# Patient Record
Sex: MALE | Race: BLACK OR AFRICAN AMERICAN | NOT HISPANIC OR LATINO | Employment: OTHER | URBAN - METROPOLITAN AREA
[De-identification: names, ages, dates, MRNs, and addresses within clinical notes are randomized per-mention and may not be internally consistent; named-entity substitution may affect disease eponyms.]

---

## 2017-10-06 ENCOUNTER — OFFICE VISIT (OUTPATIENT)
Dept: INTERNAL MEDICINE | Facility: CLINIC | Age: 47
End: 2017-10-06

## 2017-10-06 VITALS
BODY MASS INDEX: 27.6 KG/M2 | DIASTOLIC BLOOD PRESSURE: 82 MMHG | RESPIRATION RATE: 20 BRPM | HEART RATE: 70 BPM | OXYGEN SATURATION: 99 % | SYSTOLIC BLOOD PRESSURE: 120 MMHG | WEIGHT: 222 LBS | HEIGHT: 75 IN

## 2017-10-06 DIAGNOSIS — M25.512 CHRONIC PAIN IN LEFT SHOULDER: Primary | ICD-10-CM

## 2017-10-06 DIAGNOSIS — R03.0 ELEVATED BLOOD PRESSURE READING: ICD-10-CM

## 2017-10-06 DIAGNOSIS — G89.29 CHRONIC PAIN IN LEFT SHOULDER: Primary | ICD-10-CM

## 2017-10-06 PROCEDURE — 99214 OFFICE O/P EST MOD 30 MIN: CPT | Performed by: FAMILY MEDICINE

## 2017-10-06 NOTE — PATIENT INSTRUCTIONS
I have ordered a left shoulder xray for you.  Please go to the [facility] at [address] to get this done.  You do not need an appointment.  I have ordered a sports medicine referral for you.  You will be called to set up an appointment with this specialist.  In the mean time, recommend that you continue doing your previous physical therapy exercises and using your shoulder within the limits of mild pain.  I have given you a sample of a medication called Pennsaid for your pain.  You may use this twice a day as needed.  Please follow-up as indicated.  Return to the clinic sooner if your symptoms worsen prior to your specialist appointment or if you have any other concerns.

## 2017-10-06 NOTE — PROGRESS NOTES
"Marcell Malik is a 47 y.o. male who presents with complaint of Shoulder Pain      History of Present Illness   Patient presents with complaint of left shoulder pain.  Problem started in August and has been worsening.  Describes a constant aching pain, lifting and laying on his left side worsens the pain, denies associated left arm weakness, numbness, shoulder redness, bruising or swelling.  Has tried using Aleve, which helps for an hour or two, but then pain recurs.  Admits that he has been progressively increasing the intensity of his weight training exercises.  Has tried to scale back on his shoulder exercises, but this has not been helping.  States that he has done physical therapy and had a steroid shot in the past, which helped.  Has been doing his previous physical therapy exercises, which have minimally been helping.  Does not report any recent traumatic injury.    Review of Systems   Constitutional: Negative for chills and fever.   Respiratory: Negative for cough, shortness of breath and wheezing.    Cardiovascular: Negative for chest pain and palpitations.   Gastrointestinal: Negative for abdominal pain, constipation, diarrhea, nausea and vomiting.   Musculoskeletal: Positive for arthralgias (left shoulder) and joint swelling.   Neurological: Negative for dizziness, syncope, weakness, numbness and headaches.     The following portions of the patient's history were reviewed and updated as appropriate: current medications, past medical history and problem list.    Objective   /82  Pulse 70  Resp 20  Ht 75\" (190.5 cm)  Wt 222 lb (101 kg)  SpO2 99%  BMI 27.75 kg/m2     Physical Exam   Constitutional: He is oriented to person, place, and time. He appears well-developed and well-nourished.   No acute distress.   HENT:   Head: Normocephalic and atraumatic.   Eyes: Conjunctivae and EOM are normal.   Neck: Normal range of motion.   Cardiovascular: Normal rate, regular rhythm, normal heart " sounds and intact distal pulses.    Pulmonary/Chest: Effort normal and breath sounds normal. He has no wheezes.   Abdominal: Soft. Bowel sounds are normal. There is no tenderness.   Musculoskeletal:        Right shoulder: Normal.        Left shoulder: He exhibits decreased range of motion (due to pain with internal rotation and abduction). He exhibits no swelling and no deformity.   Moves all extremities.   Neurological: He is alert and oriented to person, place, and time. He has normal strength. No sensory deficit.   Skin: Skin is warm and dry.   Psychiatric: He has a normal mood and affect. His behavior is normal.   Vitals reviewed.      Assessment/Plan   John was seen today for shoulder pain.    Diagnoses and all orders for this visit:    Chronic pain in left shoulder  -     Ambulatory Referral to Sports Medicine  -     XR Shoulder 2+ View Left    Will order a left shoulder xray today to further evaluate his symptoms.  Considering the recurrence of his chronic left shoulder pain, will order a referral for sports medicine today to aid with further evaluation and management.    Elevated blood pressure reading    Improved.  Blood pressure today in office was 120/82.  No further treatment recommended at this time.